# Patient Record
Sex: FEMALE | Race: WHITE | NOT HISPANIC OR LATINO
[De-identification: names, ages, dates, MRNs, and addresses within clinical notes are randomized per-mention and may not be internally consistent; named-entity substitution may affect disease eponyms.]

---

## 2019-02-11 PROBLEM — Z00.00 ENCOUNTER FOR PREVENTIVE HEALTH EXAMINATION: Status: ACTIVE | Noted: 2019-02-11

## 2019-02-13 ENCOUNTER — APPOINTMENT (OUTPATIENT)
Dept: HEART AND VASCULAR | Facility: CLINIC | Age: 49
End: 2019-02-13
Payer: COMMERCIAL

## 2019-02-13 VITALS
DIASTOLIC BLOOD PRESSURE: 84 MMHG | BODY MASS INDEX: 20.33 KG/M2 | SYSTOLIC BLOOD PRESSURE: 120 MMHG | OXYGEN SATURATION: 100 % | HEART RATE: 71 BPM | HEIGHT: 66.5 IN | WEIGHT: 128 LBS

## 2019-02-13 PROCEDURE — 99204 OFFICE O/P NEW MOD 45 MIN: CPT

## 2019-02-13 RX ORDER — HYDROCHLOROTHIAZIDE 25 MG/1
25 TABLET ORAL
Refills: 0 | Status: ACTIVE | COMMUNITY

## 2019-02-13 NOTE — REASON FOR VISIT
[Initial Evaluation] : an initial evaluation of [Chest Pain] : chest pain [Dyspnea] : dyspnea [FreeTextEntry1] : 48 year old female with family history of CAD presents for a visit. She remarks on dyspnea. This is often noted with activity. Symptoms always improve at rest. Occasional associated chest discomfort and arm pain noted as well. We are discussing an ischemic work up at this time. \par

## 2019-02-13 NOTE — PHYSICAL EXAM
[General Appearance - Well Developed] : well developed [Normal Appearance] : normal appearance [Well Groomed] : well groomed [General Appearance - Well Nourished] : well nourished [No Deformities] : no deformities [General Appearance - In No Acute Distress] : no acute distress [Normal Conjunctiva] : the conjunctiva exhibited no abnormalities [Eyelids - No Xanthelasma] : the eyelids demonstrated no xanthelasmas [Normal Oral Mucosa] : normal oral mucosa [No Oral Pallor] : no oral pallor [No Oral Cyanosis] : no oral cyanosis [Normal Jugular Venous A Waves Present] : normal jugular venous A waves present [Normal Jugular Venous V Waves Present] : normal jugular venous V waves present [No Jugular Venous Porras A Waves] : no jugular venous porras A waves [Heart Rate And Rhythm] : heart rate and rhythm were normal [Heart Sounds] : normal S1 and S2 [Murmurs] : no murmurs present [Respiration, Rhythm And Depth] : normal respiratory rhythm and effort [Exaggerated Use Of Accessory Muscles For Inspiration] : no accessory muscle use [Auscultation Breath Sounds / Voice Sounds] : lungs were clear to auscultation bilaterally [Abdomen Soft] : soft [Abdomen Tenderness] : non-tender [Abdomen Mass (___ Cm)] : no abdominal mass palpated [Abnormal Walk] : normal gait [Gait - Sufficient For Exercise Testing] : the gait was sufficient for exercise testing [Nail Clubbing] : no clubbing of the fingernails [Cyanosis, Localized] : no localized cyanosis [Petechial Hemorrhages (___cm)] : no petechial hemorrhages [Skin Color & Pigmentation] : normal skin color and pigmentation [] : no rash [No Venous Stasis] : no venous stasis [Skin Lesions] : no skin lesions [No Skin Ulcers] : no skin ulcer [No Xanthoma] : no  xanthoma was observed [Oriented To Time, Place, And Person] : oriented to person, place, and time [Affect] : the affect was normal [Mood] : the mood was normal [No Anxiety] : not feeling anxious

## 2019-02-27 ENCOUNTER — APPOINTMENT (OUTPATIENT)
Dept: HEART AND VASCULAR | Facility: CLINIC | Age: 49
End: 2019-02-27
Payer: COMMERCIAL

## 2019-02-27 VITALS
OXYGEN SATURATION: 98 % | SYSTOLIC BLOOD PRESSURE: 113 MMHG | HEART RATE: 71 BPM | TEMPERATURE: 97.6 F | DIASTOLIC BLOOD PRESSURE: 75 MMHG

## 2019-02-27 PROCEDURE — 93351 STRESS TTE COMPLETE: CPT

## 2019-04-01 ENCOUNTER — APPOINTMENT (OUTPATIENT)
Dept: OTOLARYNGOLOGY | Facility: CLINIC | Age: 49
End: 2019-04-01
Payer: COMMERCIAL

## 2019-04-01 PROCEDURE — 99213 OFFICE O/P EST LOW 20 MIN: CPT

## 2019-04-01 NOTE — ASSESSMENT
[FreeTextEntry1] : She is clinically stable. She will be sent for a repeat thyroid sonogram and he will be seen in follow up.

## 2019-04-01 NOTE — HISTORY OF PRESENT ILLNESS
[de-identified] : She is known to me. She has a history of a multinodular thyroid gland. She has had many ultrasounds and biopsies in the past year old biopsies been negative. She does note that time her right thyroid lobe swells. She has a history of normal thyroid function. She does not of a family history of thyroid cancer or radiation exposure. Her mother does take thyroid medication.

## 2019-04-17 ENCOUNTER — TRANSCRIPTION ENCOUNTER (OUTPATIENT)
Age: 49
End: 2019-04-17

## 2020-10-06 ENCOUNTER — EMERGENCY (EMERGENCY)
Facility: HOSPITAL | Age: 50
LOS: 1 days | Discharge: HOME | End: 2020-10-06
Attending: EMERGENCY MEDICINE | Admitting: EMERGENCY MEDICINE
Payer: COMMERCIAL

## 2020-10-06 VITALS
TEMPERATURE: 98 F | RESPIRATION RATE: 16 BRPM | DIASTOLIC BLOOD PRESSURE: 80 MMHG | OXYGEN SATURATION: 97 % | SYSTOLIC BLOOD PRESSURE: 118 MMHG | HEART RATE: 75 BPM

## 2020-10-06 PROCEDURE — 99283 EMERGENCY DEPT VISIT LOW MDM: CPT | Mod: CR

## 2020-10-06 NOTE — ED PROVIDER NOTE - PATIENT PORTAL LINK FT
You can access the FollowMyHealth Patient Portal offered by [facility] by registering at the following website: [email or web address]. By joining [employer]’s FollowMyHealth portal, you will also be able to view your health information using other applications (apps) compatible with our system.

## 2020-10-06 NOTE — ED PROVIDER NOTE - OBJECTIVE STATEMENT
51 y/o Female presenting to the ED requesting COVID-19 screening. Patient is currently asymptomatic and has no other medical complaints at this time. Denies fever, chills, chest pain, SOB.

## 2020-10-06 NOTE — ED PROVIDER NOTE - NSFOLLOWUPINSTRUCTIONS_ED_ALL_ED_FT
IF YOU DO NOT GET YOUR RESULTS WITHIN 48 HOURS PLEASE CALL [phone].    IF YOUR RESULT COMES BACK POSITIVE:    1. STAY HOME for 14 DAYS  2. Minimize Human contact to ONLY ESSENTIAL  3. Every time you wash your hands, sing the HAPPY BIRTHDAY Song so you know you're washing long enough.  Make sure to scrub the webspace between your fingers.  4. DRINK 1-3 Liters of fluids day x at least 5 days.  To remain hydrated. Your fatigue, lightheadedness, and body aches will decrease and your fever has a better chance of breaking if you are well hydrated.    5. For your Fever and Body aches takes Tylenol 650-100mg every 4-6h (max 4000mg/day). Try not to use ibuprofen, aspirin or naproxen (Advil, Motrin or Aleve) as these may worsen Coronavirus infection.  6. RETURN TO THE ER IMMEDIATELY IF YOU HAVE WORSENING SHORTNESS OF BREATH. SYMPTOMS USUALLY PEAK BETWEEN DAY 7-10.

## 2020-10-07 LAB — SARS-COV-2 RNA SPEC QL NAA+PROBE: SIGNIFICANT CHANGE UP

## 2020-10-10 DIAGNOSIS — Z20.828 CONTACT WITH AND (SUSPECTED) EXPOSURE TO OTHER VIRAL COMMUNICABLE DISEASES: ICD-10-CM

## 2020-12-17 ENCOUNTER — EMERGENCY (EMERGENCY)
Facility: HOSPITAL | Age: 50
LOS: 1 days | Discharge: HOME | End: 2020-12-17
Attending: EMERGENCY MEDICINE | Admitting: EMERGENCY MEDICINE
Payer: COMMERCIAL

## 2020-12-17 VITALS
TEMPERATURE: 98 F | SYSTOLIC BLOOD PRESSURE: 144 MMHG | HEART RATE: 74 BPM | WEIGHT: 139.99 LBS | RESPIRATION RATE: 16 BRPM | HEIGHT: 66 IN | OXYGEN SATURATION: 98 % | DIASTOLIC BLOOD PRESSURE: 87 MMHG

## 2020-12-17 DIAGNOSIS — Z20.828 CONTACT WITH AND (SUSPECTED) EXPOSURE TO OTHER VIRAL COMMUNICABLE DISEASES: ICD-10-CM

## 2020-12-17 PROCEDURE — 99283 EMERGENCY DEPT VISIT LOW MDM: CPT | Mod: CR

## 2020-12-17 NOTE — ED PROVIDER NOTE - PATIENT PORTAL LINK FT
You can access the FollowMyHealth Patient Portal offered by [facility] by registering at the following website: [email or web address]. By joining [employer]’s FollowMyHealth portal, you will also be able to view your health information using other applications (apps) compatible with our system.

## 2020-12-17 NOTE — ED PROVIDER NOTE - OBJECTIVE STATEMENT
The patient is presenting to the ED requesting COVID-19 screening. The patient is currently asymptomatic and has no other medical complaints at this time. Denies headache,  fever, chills, chest pain, SOB, cough, body aches, change to sense of smell or taste

## 2020-12-18 LAB — SARS-COV-2 RNA SPEC QL NAA+PROBE: SIGNIFICANT CHANGE UP

## 2021-01-12 ENCOUNTER — NON-APPOINTMENT (OUTPATIENT)
Age: 51
End: 2021-01-12

## 2021-01-12 ENCOUNTER — APPOINTMENT (OUTPATIENT)
Dept: HEART AND VASCULAR | Facility: CLINIC | Age: 51
End: 2021-01-12
Payer: COMMERCIAL

## 2021-01-12 VITALS
BODY MASS INDEX: 21.12 KG/M2 | HEIGHT: 66.5 IN | SYSTOLIC BLOOD PRESSURE: 123 MMHG | HEART RATE: 97 BPM | OXYGEN SATURATION: 97 % | TEMPERATURE: 98.9 F | DIASTOLIC BLOOD PRESSURE: 82 MMHG | WEIGHT: 133 LBS

## 2021-01-12 PROCEDURE — 99214 OFFICE O/P EST MOD 30 MIN: CPT

## 2021-01-12 PROCEDURE — 93000 ELECTROCARDIOGRAM COMPLETE: CPT

## 2021-01-12 PROCEDURE — 99072 ADDL SUPL MATRL&STAF TM PHE: CPT

## 2021-01-12 RX ORDER — HYDROCHLOROTHIAZIDE 12.5 MG/1
TABLET ORAL
Refills: 0 | Status: COMPLETED | COMMUNITY
End: 2021-01-12

## 2021-01-12 NOTE — REASON FOR VISIT
[Follow-Up - Clinic] : a clinic follow-up of [FreeTextEntry1] : 48 year old female with family history of CAD presents for a follow up evaluation. She remarks on dyspnea and occasional chest discomfort. This is often noted with activity. Symptoms always improve at rest. Occasional associated chest discomfort and arm pain noted as well. We are discussing an ischemic work up at this time. Last testing was done in 2019. In addition, we a reviewing her lipids from [facility] and discussing a Ca score. \par

## 2021-01-12 NOTE — PHYSICAL EXAM
[General Appearance - Well Developed] : well developed [Normal Appearance] : normal appearance [Well Groomed] : well groomed [General Appearance - Well Nourished] : well nourished [No Deformities] : no deformities [Normal Conjunctiva] : the conjunctiva exhibited no abnormalities [General Appearance - In No Acute Distress] : no acute distress [Eyelids - No Xanthelasma] : the eyelids demonstrated no xanthelasmas [Normal Oral Mucosa] : normal oral mucosa [No Oral Pallor] : no oral pallor [No Oral Cyanosis] : no oral cyanosis [Normal Jugular Venous A Waves Present] : normal jugular venous A waves present [Normal Jugular Venous V Waves Present] : normal jugular venous V waves present [No Jugular Venous Porras A Waves] : no jugular venous porras A waves [Respiration, Rhythm And Depth] : normal respiratory rhythm and effort [Exaggerated Use Of Accessory Muscles For Inspiration] : no accessory muscle use [Auscultation Breath Sounds / Voice Sounds] : lungs were clear to auscultation bilaterally [Heart Rate And Rhythm] : heart rate and rhythm were normal [Heart Sounds] : normal S1 and S2 [Murmurs] : no murmurs present [Abdomen Soft] : soft [Abdomen Tenderness] : non-tender [Abdomen Mass (___ Cm)] : no abdominal mass palpated [Abnormal Walk] : normal gait [Gait - Sufficient For Exercise Testing] : the gait was sufficient for exercise testing [Nail Clubbing] : no clubbing of the fingernails [Cyanosis, Localized] : no localized cyanosis [Petechial Hemorrhages (___cm)] : no petechial hemorrhages [Skin Color & Pigmentation] : normal skin color and pigmentation [] : no rash [No Venous Stasis] : no venous stasis [Skin Lesions] : no skin lesions [No Skin Ulcers] : no skin ulcer [No Xanthoma] : no  xanthoma was observed [Oriented To Time, Place, And Person] : oriented to person, place, and time [Mood] : the mood was normal [Affect] : the affect was normal [No Anxiety] : not feeling anxious

## 2021-01-12 NOTE — DISCUSSION/SUMMARY
[FreeTextEntry1] : HTN - MARK  and I had an extensive discussion regarding his blood pressure management. Patient will continue taking current medications in addition to maintaining a low Na diet, with periodic b/p checks at home.\par CP MARK and I had a discussion of his symptoms. Her risk for CAD falls intro intermediate. We will therefore move forward with a stress echo at this time to evaluate for obstructive CAD. Risks and benefits discussed. Will add a Ca score as well\par

## 2021-01-19 ENCOUNTER — APPOINTMENT (OUTPATIENT)
Dept: CT IMAGING | Facility: CLINIC | Age: 51
End: 2021-01-19
Payer: SELF-PAY

## 2021-01-19 ENCOUNTER — OUTPATIENT (OUTPATIENT)
Dept: OUTPATIENT SERVICES | Facility: HOSPITAL | Age: 51
LOS: 1 days | End: 2021-01-19

## 2021-01-19 PROCEDURE — 75571 CT HRT W/O DYE W/CA TEST: CPT | Mod: 26

## 2021-01-26 ENCOUNTER — APPOINTMENT (OUTPATIENT)
Dept: HEART AND VASCULAR | Facility: CLINIC | Age: 51
End: 2021-01-26
Payer: COMMERCIAL

## 2021-01-26 PROCEDURE — 93351 STRESS TTE COMPLETE: CPT

## 2021-01-26 PROCEDURE — 99072 ADDL SUPL MATRL&STAF TM PHE: CPT

## 2021-01-26 PROCEDURE — 99214 OFFICE O/P EST MOD 30 MIN: CPT

## 2021-01-26 NOTE — PHYSICAL EXAM
[General Appearance - Well Developed] : well developed [Normal Appearance] : normal appearance [Well Groomed] : well groomed [General Appearance - Well Nourished] : well nourished [No Deformities] : no deformities [General Appearance - In No Acute Distress] : no acute distress [Normal Conjunctiva] : the conjunctiva exhibited no abnormalities [Eyelids - No Xanthelasma] : the eyelids demonstrated no xanthelasmas [Normal Oral Mucosa] : normal oral mucosa [No Oral Pallor] : no oral pallor [No Oral Cyanosis] : no oral cyanosis [Normal Jugular Venous A Waves Present] : normal jugular venous A waves present [Normal Jugular Venous V Waves Present] : normal jugular venous V waves present [No Jugular Venous Porras A Waves] : no jugular venous porras A waves [Respiration, Rhythm And Depth] : normal respiratory rhythm and effort [Exaggerated Use Of Accessory Muscles For Inspiration] : no accessory muscle use [Auscultation Breath Sounds / Voice Sounds] : lungs were clear to auscultation bilaterally [Heart Rate And Rhythm] : heart rate and rhythm were normal [Heart Sounds] : normal S1 and S2 [Murmurs] : no murmurs present [Abdomen Soft] : soft [Abdomen Tenderness] : non-tender [Abdomen Mass (___ Cm)] : no abdominal mass palpated [Abnormal Walk] : normal gait [Gait - Sufficient For Exercise Testing] : the gait was sufficient for exercise testing [Nail Clubbing] : no clubbing of the fingernails [Cyanosis, Localized] : no localized cyanosis [Petechial Hemorrhages (___cm)] : no petechial hemorrhages [Skin Color & Pigmentation] : normal skin color and pigmentation [] : no rash [No Venous Stasis] : no venous stasis [No Skin Ulcers] : no skin ulcer [Skin Lesions] : no skin lesions [No Xanthoma] : no  xanthoma was observed [Oriented To Time, Place, And Person] : oriented to person, place, and time [Affect] : the affect was normal [Mood] : the mood was normal [No Anxiety] : not feeling anxious

## 2021-01-26 NOTE — DISCUSSION/SUMMARY
[FreeTextEntry1] : HTN - MARK  and I had an extensive discussion regarding his blood pressure management. Patient will continue taking current medications in addition to maintaining a low Na diet, with periodic b/p checks at home.\par CP Inclined towards a conservative follow up in this patient. We had a careful discussion regarding diet and exercise. Will be happy to re-evaluate.\par

## 2021-01-26 NOTE — REASON FOR VISIT
[Follow-Up - Clinic] : a clinic follow-up of [Hypertension] : hypertension [FreeTextEntry1] : 48 year old female with family history of CAD presents for a follow up evaluation. She remarks on dyspnea and occasional chest discomfort. This is often noted with activity. Symptoms always improve at rest. Occasional associated chest discomfort and arm pain noted as well. We are discussing an ischemic work up at this time. Last testing was done in 2019. In addition, we a reviewing her lipids from [facility] and discussing a Ca score. She also completed a stress echo today. \par

## 2021-02-21 ENCOUNTER — EMERGENCY (EMERGENCY)
Facility: HOSPITAL | Age: 51
LOS: 1 days | Discharge: HOME | End: 2021-02-21
Attending: EMERGENCY MEDICINE | Admitting: EMERGENCY MEDICINE
Payer: COMMERCIAL

## 2021-02-21 VITALS
DIASTOLIC BLOOD PRESSURE: 80 MMHG | HEIGHT: 66 IN | SYSTOLIC BLOOD PRESSURE: 126 MMHG | TEMPERATURE: 99 F | RESPIRATION RATE: 18 BRPM | OXYGEN SATURATION: 97 % | HEART RATE: 86 BPM

## 2021-02-21 DIAGNOSIS — Z20.822 CONTACT WITH AND (SUSPECTED) EXPOSURE TO COVID-19: ICD-10-CM

## 2021-02-21 PROCEDURE — 99282 EMERGENCY DEPT VISIT SF MDM: CPT

## 2021-02-21 NOTE — ED PROVIDER NOTE - OBJECTIVE STATEMENT
The patient is presenting to the ED requesting COVID-19 screening. The patient is currently asymptomatic and has no other medical complaints at this time. Denies headache,  fever, chills, chest pain, SOB, cough, body aches, nausea/vomiting/diarrhea, or change to sense of smell or taste.  The patient just returned from [facility] yesterday.  No known exposures.  She and her family had COVID in March 2020 with full recovery and have tested positive for antibodies since then.

## 2021-02-21 NOTE — ED PROVIDER NOTE - PATIENT PORTAL LINK FT
You can access the FollowMyHealth Patient Portal offered by [facility] by registering at the following website: [email or web address]. By joining [email or web address]’s FollowMyHealth portal, you will also be able to view your health information using other applications (apps) compatible with our system.

## 2021-02-21 NOTE — ED PROVIDER NOTE - CLINICAL SUMMARY MEDICAL DECISION MAKING FREE TEXT BOX
Asymptomatic patient here for COVID screening. Risk of exposure is very low. Reviewed vitals and testing plan with the patient. Encouraged to download the MyHealth kaycee for test results.

## 2021-02-22 LAB — SARS-COV-2 RNA SPEC QL NAA+PROBE: SIGNIFICANT CHANGE UP

## 2021-02-24 ENCOUNTER — EMERGENCY (EMERGENCY)
Facility: HOSPITAL | Age: 51
LOS: 1 days | Discharge: HOME | End: 2021-02-24
Attending: EMERGENCY MEDICINE | Admitting: EMERGENCY MEDICINE
Payer: COMMERCIAL

## 2021-02-24 VITALS
DIASTOLIC BLOOD PRESSURE: 83 MMHG | RESPIRATION RATE: 18 BRPM | HEIGHT: 66 IN | TEMPERATURE: 98 F | HEART RATE: 73 BPM | OXYGEN SATURATION: 98 % | SYSTOLIC BLOOD PRESSURE: 127 MMHG

## 2021-02-24 DIAGNOSIS — Z20.822 CONTACT WITH AND (SUSPECTED) EXPOSURE TO COVID-19: ICD-10-CM

## 2021-02-24 PROCEDURE — 99282 EMERGENCY DEPT VISIT SF MDM: CPT

## 2021-02-24 NOTE — ED PROVIDER NOTE - OBJECTIVE STATEMENT
Patient is presenting to the Emergency Department with a request to have COVID-19 testing.  Denies symptoms, including cough, shortness of breath, fever, chills, body aches or sore throat. Patient is presenting to the Emergency Department with a request to have COVID-19 testing.  Denies symptoms, including cough, shortness of breath, fever, chills, body aches or sore throat.  hx COVID March 2020

## 2021-02-24 NOTE — ED PROVIDER NOTE - PATIENT PORTAL LINK FT
You can access the FollowMyHealth Patient Portal offered by [facility] by registering at the following website: [email or web address]. By joining [employer]’s FollowMyHealth portal, you will also be able to view your health information using other applications (apps) compatible with our system.

## 2021-02-25 PROBLEM — Z78.9 OTHER SPECIFIED HEALTH STATUS: Chronic | Status: ACTIVE | Noted: 2021-02-21

## 2021-02-25 LAB — SARS-COV-2 RNA SPEC QL NAA+PROBE: SIGNIFICANT CHANGE UP

## 2021-04-14 ENCOUNTER — NON-APPOINTMENT (OUTPATIENT)
Age: 51
End: 2021-04-14

## 2021-04-14 ENCOUNTER — APPOINTMENT (OUTPATIENT)
Age: 51
End: 2021-04-14
Payer: COMMERCIAL

## 2021-04-14 PROCEDURE — 0001A: CPT

## 2021-04-30 ENCOUNTER — APPOINTMENT (OUTPATIENT)
Dept: OTOLARYNGOLOGY | Facility: CLINIC | Age: 51
End: 2021-04-30
Payer: COMMERCIAL

## 2021-04-30 VITALS — HEIGHT: 66.5 IN | WEIGHT: 132 LBS | BODY MASS INDEX: 20.96 KG/M2 | TEMPERATURE: 98.2 F

## 2021-04-30 PROCEDURE — 99213 OFFICE O/P EST LOW 20 MIN: CPT

## 2021-04-30 PROCEDURE — 99072 ADDL SUPL MATRL&STAF TM PHE: CPT

## 2021-05-04 NOTE — HISTORY OF PRESENT ILLNESS
[de-identified] : She is well-known to me.\par She has a history of a multinodular thyroid gland.\par An ultrasound 2019 of her thyroid gland stable.\par She does not have a family history of thyroid cancer or significant radiation exposure. She is not on any compressive features.\par

## 2021-05-04 NOTE — ASSESSMENT
[FreeTextEntry1] : Multinodular thyroid gland.\par She will be sent for sonogram of her thyroid for further evaluation and be seen in follow.

## 2021-05-05 ENCOUNTER — APPOINTMENT (OUTPATIENT)
Age: 51
End: 2021-05-05
Payer: COMMERCIAL

## 2021-05-05 PROCEDURE — 0002A: CPT

## 2021-05-08 ENCOUNTER — RESULT REVIEW (OUTPATIENT)
Age: 51
End: 2021-05-08

## 2021-05-08 ENCOUNTER — APPOINTMENT (OUTPATIENT)
Dept: ULTRASOUND IMAGING | Facility: CLINIC | Age: 51
End: 2021-05-08
Payer: COMMERCIAL

## 2021-05-08 ENCOUNTER — OUTPATIENT (OUTPATIENT)
Dept: OUTPATIENT SERVICES | Facility: HOSPITAL | Age: 51
LOS: 1 days | End: 2021-05-08

## 2021-05-08 PROCEDURE — 76536 US EXAM OF HEAD AND NECK: CPT | Mod: 26

## 2021-06-03 ENCOUNTER — RESULT REVIEW (OUTPATIENT)
Age: 51
End: 2021-06-03

## 2021-06-03 ENCOUNTER — OUTPATIENT (OUTPATIENT)
Dept: OUTPATIENT SERVICES | Facility: HOSPITAL | Age: 51
LOS: 1 days | End: 2021-06-03

## 2021-06-03 ENCOUNTER — APPOINTMENT (OUTPATIENT)
Dept: ULTRASOUND IMAGING | Facility: CLINIC | Age: 51
End: 2021-06-03
Payer: COMMERCIAL

## 2021-06-03 PROCEDURE — 88305 TISSUE EXAM BY PATHOLOGIST: CPT | Mod: 26

## 2021-06-03 PROCEDURE — 10006 FNA BX W/US GDN EA ADDL: CPT

## 2021-06-03 PROCEDURE — 88173 CYTOPATH EVAL FNA REPORT: CPT | Mod: 26

## 2021-06-03 PROCEDURE — 10005 FNA BX W/US GDN 1ST LES: CPT

## 2021-06-08 LAB
NON-GYNECOLOGICAL CYTOLOGY STUDY: SIGNIFICANT CHANGE UP
NON-GYNECOLOGICAL CYTOLOGY STUDY: SIGNIFICANT CHANGE UP

## 2021-07-20 ENCOUNTER — APPOINTMENT (OUTPATIENT)
Dept: HEART AND VASCULAR | Facility: CLINIC | Age: 51
End: 2021-07-20
Payer: COMMERCIAL

## 2021-07-20 VITALS
DIASTOLIC BLOOD PRESSURE: 79 MMHG | BODY MASS INDEX: 21.44 KG/M2 | SYSTOLIC BLOOD PRESSURE: 114 MMHG | HEART RATE: 80 BPM | WEIGHT: 135 LBS | OXYGEN SATURATION: 99 % | HEIGHT: 66.5 IN

## 2021-07-20 PROCEDURE — 99072 ADDL SUPL MATRL&STAF TM PHE: CPT

## 2021-07-20 PROCEDURE — 99214 OFFICE O/P EST MOD 30 MIN: CPT

## 2021-07-20 NOTE — REASON FOR VISIT
[Follow-Up - Clinic] : a clinic follow-up of [Hypertension] : hypertension [FreeTextEntry1] : 48 year old female with family history of CAD presents for a follow up evaluation. She remarks on dyspnea and occasional chest discomfort. This is often noted with activity. Symptoms always improve at rest. Occasional associated chest discomfort and arm pain noted as well. We are discussing an ischemic work up at this time. Last testing was done in 2019. In addition, we a reviewing her lipids from [facility] and discussing a Ca score. She also completed a stress echo today. Since her last visit she has been well with no CV symptoms. Her angina has now resolved but she follows up 2/2 family hx of heart disease. She reports eating healthy but has increased protein in her diet as recommended by her PMD. She will have her labs drawn at [facility] and send over the results.\par

## 2021-07-20 NOTE — DISCUSSION/SUMMARY
[FreeTextEntry1] : HTN - MARK  and I had an extensive discussion regarding his blood pressure management. Patient will continue taking current medications in addition to maintaining a low Na diet, with periodic b/p checks at home.\par CP Inclined towards a conservative follow up in this patient. We had a careful discussion regarding diet and exercise. Will be happy to re-evaluate.\par Results of testing discussed; will see in 6 months

## 2021-07-20 NOTE — PHYSICAL EXAM
[General Appearance - Well Developed] : well developed [Normal Appearance] : normal appearance [Well Groomed] : well groomed [General Appearance - Well Nourished] : well nourished [No Deformities] : no deformities [General Appearance - In No Acute Distress] : no acute distress [Eyelids - No Xanthelasma] : the eyelids demonstrated no xanthelasmas [Normal Oral Mucosa] : normal oral mucosa [No Oral Pallor] : no oral pallor [No Oral Cyanosis] : no oral cyanosis [Normal Jugular Venous A Waves Present] : normal jugular venous A waves present [Normal Jugular Venous V Waves Present] : normal jugular venous V waves present [No Jugular Venous Porras A Waves] : no jugular venous porras A waves [Respiration, Rhythm And Depth] : normal respiratory rhythm and effort [Exaggerated Use Of Accessory Muscles For Inspiration] : no accessory muscle use [Auscultation Breath Sounds / Voice Sounds] : lungs were clear to auscultation bilaterally [Heart Rate And Rhythm] : heart rate and rhythm were normal [Heart Sounds] : normal S1 and S2 [Murmurs] : no murmurs present [Abdomen Soft] : soft [Abdomen Tenderness] : non-tender [Abdomen Mass (___ Cm)] : no abdominal mass palpated [Abnormal Walk] : normal gait [Gait - Sufficient For Exercise Testing] : the gait was sufficient for exercise testing [Nail Clubbing] : no clubbing of the fingernails [Cyanosis, Localized] : no localized cyanosis [Petechial Hemorrhages (___cm)] : no petechial hemorrhages [Skin Color & Pigmentation] : normal skin color and pigmentation [] : no rash [No Venous Stasis] : no venous stasis [No Skin Ulcers] : no skin ulcer [Skin Lesions] : no skin lesions [No Xanthoma] : no  xanthoma was observed [Oriented To Time, Place, And Person] : oriented to person, place, and time [Affect] : the affect was normal [Mood] : the mood was normal [No Anxiety] : not feeling anxious [Well Developed] : well developed [Well Nourished] : well nourished [Normal Conjunctiva] : normal conjunctiva [No Acute Distress] : no acute distress [Normal Venous Pressure] : normal venous pressure [No Carotid Bruit] : no carotid bruit [Normal S1, S2] : normal S1, S2 [No Murmur] : no murmur [No Rub] : no rub [No Gallop] : no gallop [Good Air Entry] : good air entry [Clear Lung Fields] : clear lung fields [No Respiratory Distress] : no respiratory distress  [Soft] : abdomen soft [Non Tender] : non-tender [No Masses/organomegaly] : no masses/organomegaly [Normal Bowel Sounds] : normal bowel sounds [Normal Gait] : normal gait [No Edema] : no edema [No Cyanosis] : no cyanosis [No Varicosities] : no varicosities [No Clubbing] : no clubbing [No Rash] : no rash [No Skin Lesions] : no skin lesions [Moves all extremities] : moves all extremities [No Focal Deficits] : no focal deficits [Normal Speech] : normal speech [Normal memory] : normal memory [Alert and Oriented] : alert and oriented

## 2021-08-23 NOTE — ED ADULT TRIAGE NOTE - IDEAL BODY WEIGHT(KG)
Called Patient and left message for Patient to call clinic to schedule hospital visit for hypothyroidism  
Called and left message for Patient to call clinic and schedule a hospital follow-up appointment. Letter sent.  
Please call patient to arrange endocrine hospital follow up for hypothyroidism with next available APC or MD in 4-6 weeks.   Thank you!  
59

## 2022-01-14 ENCOUNTER — NON-APPOINTMENT (OUTPATIENT)
Age: 52
End: 2022-01-14

## 2022-01-14 ENCOUNTER — APPOINTMENT (OUTPATIENT)
Dept: HEART AND VASCULAR | Facility: CLINIC | Age: 52
End: 2022-01-14
Payer: COMMERCIAL

## 2022-01-14 VITALS
DIASTOLIC BLOOD PRESSURE: 68 MMHG | HEIGHT: 66.5 IN | TEMPERATURE: 98.3 F | HEART RATE: 78 BPM | OXYGEN SATURATION: 97 % | SYSTOLIC BLOOD PRESSURE: 123 MMHG | WEIGHT: 133 LBS | BODY MASS INDEX: 21.12 KG/M2

## 2022-01-14 PROCEDURE — 93000 ELECTROCARDIOGRAM COMPLETE: CPT

## 2022-01-14 PROCEDURE — 99214 OFFICE O/P EST MOD 30 MIN: CPT

## 2022-01-14 NOTE — REVIEW OF SYSTEMS
[Fever] : no fever [Chills] : no chills [Blurry Vision] : no blurred vision [SOB] : no shortness of breath [Dyspnea on exertion] : not dyspnea during exertion [Chest Discomfort] : no chest discomfort [Lower Ext Edema] : no extremity edema [Palpitations] : no palpitations [Cough] : no cough [Abdominal Pain] : no abdominal pain [Nausea] : no nausea [Vomiting] : no vomiting [Negative] : Heme/Lymph

## 2022-01-14 NOTE — PHYSICAL EXAM
[Alert and Oriented] : alert and oriented [Well Developed] : well developed [Well Nourished] : well nourished [No Acute Distress] : no acute distress [Normal Conjunctiva] : normal conjunctiva [Normal Venous Pressure] : normal venous pressure [No Carotid Bruit] : no carotid bruit [Normal S1, S2] : normal S1, S2 [No Murmur] : no murmur [No Rub] : no rub [No Gallop] : no gallop [Clear Lung Fields] : clear lung fields [Good Air Entry] : good air entry [No Respiratory Distress] : no respiratory distress  [Soft] : abdomen soft [Non Tender] : non-tender [No Masses/organomegaly] : no masses/organomegaly [Normal Bowel Sounds] : normal bowel sounds [Normal Gait] : normal gait [No Edema] : no edema [No Cyanosis] : no cyanosis [No Clubbing] : no clubbing [No Varicosities] : no varicosities [No Rash] : no rash [No Skin Lesions] : no skin lesions [Moves all extremities] : moves all extremities [No Focal Deficits] : no focal deficits [Normal Speech] : normal speech [Normal memory] : normal memory

## 2022-01-14 NOTE — ASSESSMENT
[FreeTextEntry1] : A/P: 51F w/HTN & FHx of CAD presenting for follow up.\par \par #Chest tightness - transient episode, no further episodes. Low suspicion for cardiac chest pain given negative calcium socre and stress echo from last year.\par - will monitor for further symptoms\par \par #HTN - managed by primary care doctor\par - c/w HCTZ\par \par #Screening\par - to get annual physical & labs with primary doctor\par \par RTC in 6 months or PRN\par

## 2022-01-14 NOTE — REASON FOR VISIT
[Symptom and Test Evaluation] : symptom and test evaluation [FreeTextEntry1] : 51F w/HTN & FHx of CAD presenting for follow up. Overall feels well. Episode of waking up in the middle of night with left sided chest tightness one month ago that resolved after a half hour. She has not had it happen again since then. She attributed it to heartburn. Maintains an active lifestyle; jogs 3x/week and pilates 2x/week.Had a recent viral illness with GI symptoms; tested (-) multiple times for COVID. Follows with Dr. Espinal at [facility] for primary care.

## 2022-01-14 NOTE — DISCUSSION/SUMMARY
[FreeTextEntry1] : CP Inclined towards a conservative follow up in this patient. We had a careful discussion regarding diet and exercise. Will be happy to re-evaluate. No additional testing for now\par HTN - MARK  and I had an extensive discussion regarding his blood pressure management. Patient will continue taking current medications in addition to maintaining a low Na diet, with periodic b/p checks at home.\par EKG section of the chart --- secondary to symptoms above an electrocardiogram also known as an EKG was performed.  Risks and benefits discussed with the patient. Patient was given time and privacy to changed into a gown. Shortly after, standard 10 leads were applied and a Blastbeat system was used to perform the study. The results were subsequently reviewed by attending physician and discussed with the patient. The study showed a normal sinus rhythm and no ST-T suggestive of ischemia. Order for the EKG was placed in the chart. The results were documented. Billing submitted. Emotional support provided.\par

## 2022-07-08 ENCOUNTER — NON-APPOINTMENT (OUTPATIENT)
Age: 52
End: 2022-07-08

## 2022-07-08 ENCOUNTER — APPOINTMENT (OUTPATIENT)
Dept: HEART AND VASCULAR | Facility: CLINIC | Age: 52
End: 2022-07-08

## 2022-07-08 VITALS
HEIGHT: 66.5 IN | HEART RATE: 69 BPM | OXYGEN SATURATION: 99 % | BODY MASS INDEX: 21.44 KG/M2 | TEMPERATURE: 97.9 F | DIASTOLIC BLOOD PRESSURE: 81 MMHG | WEIGHT: 135 LBS | SYSTOLIC BLOOD PRESSURE: 121 MMHG

## 2022-07-08 PROCEDURE — 93000 ELECTROCARDIOGRAM COMPLETE: CPT

## 2022-07-08 PROCEDURE — 99213 OFFICE O/P EST LOW 20 MIN: CPT

## 2022-07-08 NOTE — REASON FOR VISIT
[Symptom and Test Evaluation] : symptom and test evaluation [FreeTextEntry1] : Presents for a follow up and re-evaluation. No chest pain. Occasional dyspnea noted. She is quite active. No recent PMD evaluation. No syncope. Doing well otherwise.

## 2022-07-08 NOTE — DISCUSSION/SUMMARY
[FreeTextEntry1] : CP Inclined towards a conservative follow up in this patient. We had a careful discussion regarding diet and exercise. Will be happy to re-evaluate.\par HTN - MARK  and I had an extensive discussion regarding his blood pressure management. Patient will continue taking current medications in addition to maintaining a low Na diet, with periodic b/p checks at home.\par EKG section of the chart --- secondary to symptoms above an electrocardiogram also known as an EKG was performed.  Risks and benefits discussed with the patient. Patient was given time and privacy to changed into a gown. Shortly after, standard 10 leads were applied and a [employer] system was used to perform the study. The results were subsequently reviewed by attending physician and discussed with the patient. The study showed a normal sinus rhythm and no ST-T suggestive of ischemia. Order for the EKG was placed in the chart. The results were documented. Billing submitted. Emotional support provided.\par

## 2022-12-14 ENCOUNTER — APPOINTMENT (OUTPATIENT)
Dept: HEART AND VASCULAR | Facility: CLINIC | Age: 52
End: 2022-12-14

## 2022-12-14 PROCEDURE — 99442: CPT

## 2023-01-30 ENCOUNTER — NON-APPOINTMENT (OUTPATIENT)
Age: 53
End: 2023-01-30

## 2023-01-30 ENCOUNTER — APPOINTMENT (OUTPATIENT)
Dept: HEART AND VASCULAR | Facility: CLINIC | Age: 53
End: 2023-01-30
Payer: COMMERCIAL

## 2023-01-30 VITALS
HEART RATE: 72 BPM | DIASTOLIC BLOOD PRESSURE: 70 MMHG | HEIGHT: 66.5 IN | SYSTOLIC BLOOD PRESSURE: 107 MMHG | OXYGEN SATURATION: 99 % | BODY MASS INDEX: 22.23 KG/M2 | WEIGHT: 140 LBS

## 2023-01-30 DIAGNOSIS — E04.2 NONTOXIC MULTINODULAR GOITER: ICD-10-CM

## 2023-01-30 PROCEDURE — 99214 OFFICE O/P EST MOD 30 MIN: CPT

## 2023-01-30 PROCEDURE — 93000 ELECTROCARDIOGRAM COMPLETE: CPT

## 2023-01-30 RX ORDER — ROSUVASTATIN CALCIUM 10 MG/1
10 TABLET, FILM COATED ORAL
Qty: 30 | Refills: 3 | Status: ACTIVE | COMMUNITY
Start: 2023-01-30

## 2023-01-30 NOTE — DISCUSSION/SUMMARY
[FreeTextEntry1] : CP/SOB Inclined towards a conservative follow up in this patient. We had a careful discussion regarding diet and exercise. Will be happy to re-evaluate.\par HTN - MARK  and I had an extensive discussion regarding his blood pressure management. Patient will continue taking current medications in addition to maintaining a low Na diet, with periodic b/p checks at home.\par HLD MARK and I discussed his lipid panel and individualized target LDL goal. At this point, will do diet and exercise with anticipation of re-evaluating labs in 3-6 months\par CAD cont ASA\par EKG section of the chart --- secondary to symptoms above an electrocardiogram also known as an EKG was performed.  Risks and benefits discussed with the patient. Patient was given time and privacy to changed into a gown. Shortly after, standard 10 leads were applied and a [employer] system was used to perform the study. The results were subsequently reviewed by attending physician and discussed with the patient. The study showed a normal sinus rhythm and no ST-T suggestive of ischemia. Order for the EKG was placed in the chart. The results were documented. Billing submitted. Emotional support provided.\par

## 2023-07-26 ENCOUNTER — APPOINTMENT (OUTPATIENT)
Dept: HEART AND VASCULAR | Facility: CLINIC | Age: 53
End: 2023-07-26

## 2023-07-28 ENCOUNTER — NON-APPOINTMENT (OUTPATIENT)
Age: 53
End: 2023-07-28

## 2023-07-28 ENCOUNTER — APPOINTMENT (OUTPATIENT)
Dept: HEART AND VASCULAR | Facility: CLINIC | Age: 53
End: 2023-07-28
Payer: COMMERCIAL

## 2023-07-28 VITALS
SYSTOLIC BLOOD PRESSURE: 118 MMHG | WEIGHT: 132 LBS | DIASTOLIC BLOOD PRESSURE: 83 MMHG | HEIGHT: 66 IN | OXYGEN SATURATION: 98 % | HEART RATE: 82 BPM | BODY MASS INDEX: 21.21 KG/M2 | TEMPERATURE: 97.9 F

## 2023-07-28 PROCEDURE — 93000 ELECTROCARDIOGRAM COMPLETE: CPT

## 2023-07-28 PROCEDURE — 99214 OFFICE O/P EST MOD 30 MIN: CPT

## 2023-07-28 NOTE — DISCUSSION/SUMMARY
[FreeTextEntry1] : SOB/CP check echo if able to approve and schedule.\par Leg fatigue check RADHA and doppler if able to approve and schedule.\par Edema improved check venous us if able to approve and schedule.\par HTN - MARK  and I had an extensive discussion regarding his blood pressure management. Patient will continue taking current medications in addition to maintaining a low Na diet, with periodic b/p checks at home.\par HLD MARK and I discussed his lipid panel and individualized target LDL goal. At this point, will do diet and exercise with anticipation of re-evaluating labs in 3-6 months\par EKG section of the chart --- secondary to symptoms above an electrocardiogram also known as an EKG was performed.  Risks and benefits discussed with the patient. Patient was given time and privacy to changed into a gown. Shortly after, standard 10 leads were applied and a [employer] system was used to perform the study. The results were subsequently reviewed by attending physician and discussed with the patient. The study showed a normal sinus rhythm and no ST-T suggestive of ischemia. Order for the EKG was placed in the chart. The results were documented. Billing submitted.\par  [EKG obtained to assist in diagnosis and management of assessed problem(s)] : EKG obtained to assist in diagnosis and management of assessed problem(s)

## 2023-07-28 NOTE — REASON FOR VISIT
[Symptom and Test Evaluation] : symptom and test evaluation [FreeTextEntry1] : Presents for a follow up and re-evaluation. No chest pain. Occasional dyspnea noted. She is quite active. No recent PMD evaluation. No syncope. Doing well otherwise. She does remark on edema and leg discomfort on occasion. This is often noted with activity. Symptoms always improve at rest.\par We are reviewing a PVD work up at this time.

## 2023-10-06 ENCOUNTER — APPOINTMENT (OUTPATIENT)
Dept: HEART AND VASCULAR | Facility: CLINIC | Age: 53
End: 2023-10-06
Payer: COMMERCIAL

## 2023-10-06 DIAGNOSIS — I10 ESSENTIAL (PRIMARY) HYPERTENSION: ICD-10-CM

## 2023-10-06 DIAGNOSIS — R07.89 OTHER CHEST PAIN: ICD-10-CM

## 2023-10-06 PROCEDURE — 93970 EXTREMITY STUDY: CPT

## 2023-10-06 PROCEDURE — 93922 UPR/L XTREMITY ART 2 LEVELS: CPT

## 2023-10-06 PROCEDURE — 93925 LOWER EXTREMITY STUDY: CPT

## 2023-10-13 ENCOUNTER — APPOINTMENT (OUTPATIENT)
Dept: HEART AND VASCULAR | Facility: CLINIC | Age: 53
End: 2023-10-13
Payer: COMMERCIAL

## 2023-10-13 VITALS
HEIGHT: 66 IN | DIASTOLIC BLOOD PRESSURE: 85 MMHG | OXYGEN SATURATION: 99 % | TEMPERATURE: 98.2 F | WEIGHT: 132 LBS | SYSTOLIC BLOOD PRESSURE: 124 MMHG | HEART RATE: 85 BPM | BODY MASS INDEX: 21.21 KG/M2

## 2023-10-13 PROCEDURE — 93306 TTE W/DOPPLER COMPLETE: CPT

## 2023-10-13 PROCEDURE — 99442: CPT

## 2023-10-17 PROBLEM — R07.89 ATYPICAL CHEST PAIN: Status: ACTIVE | Noted: 2019-02-13

## 2023-10-17 PROBLEM — I10 HYPERTENSION, BENIGN: Status: ACTIVE | Noted: 2019-02-13

## 2023-11-21 ENCOUNTER — APPOINTMENT (OUTPATIENT)
Dept: VASCULAR SURGERY | Facility: CLINIC | Age: 53
End: 2023-11-21

## 2023-12-05 ENCOUNTER — APPOINTMENT (OUTPATIENT)
Dept: VASCULAR SURGERY | Facility: CLINIC | Age: 53
End: 2023-12-05
Payer: COMMERCIAL

## 2023-12-05 VITALS
DIASTOLIC BLOOD PRESSURE: 73 MMHG | SYSTOLIC BLOOD PRESSURE: 111 MMHG | OXYGEN SATURATION: 98 % | BODY MASS INDEX: 22.5 KG/M2 | HEIGHT: 66 IN | HEART RATE: 94 BPM | WEIGHT: 140 LBS | TEMPERATURE: 98.4 F

## 2023-12-05 DIAGNOSIS — I83.893 VARICOSE VEINS OF BILATERAL LOWER EXTREMITIES WITH OTHER COMPLICATIONS: ICD-10-CM

## 2023-12-05 DIAGNOSIS — I83.813 VARICOSE VEINS OF BILATERAL LOWER EXTREMITIES WITH PAIN: ICD-10-CM

## 2023-12-05 PROCEDURE — 93970 EXTREMITY STUDY: CPT

## 2023-12-05 PROCEDURE — 99203 OFFICE O/P NEW LOW 30 MIN: CPT | Mod: 25

## 2024-01-16 NOTE — ED PROVIDER NOTE - NS ED ROS FT
CONSTITUTIONAL:  No fever or chills, no bodyaches  HEENT:  No sore throat or headache, no nasal congestion  PULM:  No cough or shortness of breath  GI:  No diarrhea, no vomiting
RLE grossly 3/5, all other extremities grossly WFL/WNL/grossly assessed due to

## 2024-07-25 ENCOUNTER — APPOINTMENT (OUTPATIENT)
Dept: OTOLARYNGOLOGY | Facility: CLINIC | Age: 54
End: 2024-07-25
Payer: COMMERCIAL

## 2024-07-25 VITALS — HEIGHT: 66 IN | WEIGHT: 133 LBS | BODY MASS INDEX: 21.38 KG/M2

## 2024-07-25 DIAGNOSIS — Z86.79 PERSONAL HISTORY OF OTHER DISEASES OF THE CIRCULATORY SYSTEM: ICD-10-CM

## 2024-07-25 DIAGNOSIS — Z78.9 OTHER SPECIFIED HEALTH STATUS: ICD-10-CM

## 2024-07-25 DIAGNOSIS — Z83.3 FAMILY HISTORY OF DIABETES MELLITUS: ICD-10-CM

## 2024-07-25 DIAGNOSIS — Z87.891 PERSONAL HISTORY OF NICOTINE DEPENDENCE: ICD-10-CM

## 2024-07-25 DIAGNOSIS — E04.1 NONTOXIC SINGLE THYROID NODULE: ICD-10-CM

## 2024-07-25 PROCEDURE — 99203 OFFICE O/P NEW LOW 30 MIN: CPT

## 2024-07-25 RX ORDER — BUPROPION HYDROCHLORIDE 75 MG/1
TABLET, FILM COATED ORAL
Refills: 0 | Status: ACTIVE | COMMUNITY

## 2024-07-29 NOTE — HISTORY OF PRESENT ILLNESS
[de-identified] : Previously well-known to me.  History of thyroid nodularity.  Multiple benign cytologies in the past. Describes having essentially a cyst that has been aspirated many times.  At this point she is having some swelling as she described in her right thyroid area. No pain. No dysphagia or compressive features.  No change in voice.

## 2024-07-29 NOTE — ASSESSMENT
[FreeTextEntry1] : Thyroid nodule with cyst.  Explained to her the natural history of cysts reaccumulating with fluid. Will send for thyroid ultrasound at this time.

## 2024-12-30 ENCOUNTER — APPOINTMENT (OUTPATIENT)
Dept: OTOLARYNGOLOGY | Facility: CLINIC | Age: 54
End: 2024-12-30
Payer: COMMERCIAL

## 2024-12-30 DIAGNOSIS — H92.01 OTALGIA, RIGHT EAR: ICD-10-CM

## 2024-12-30 DIAGNOSIS — M26.629 ARTHRALGIA OF TEMPOROMANDIBULAR JOINT,: ICD-10-CM

## 2024-12-30 DIAGNOSIS — E04.1 NONTOXIC SINGLE THYROID NODULE: ICD-10-CM

## 2024-12-30 PROCEDURE — 99213 OFFICE O/P EST LOW 20 MIN: CPT

## 2024-12-31 NOTE — HISTORY OF PRESENT ILLNESS
[de-identified] : 12/30/2024: 53 y/o F well known to me with history of thyroid nodule, multiple benign cytologies in the past presents for repeat evaluation. Of note she did not have US thyroid as ordered by last visit.  Today she presents with right ear ache which started 4 days ago, which she would describe as a 7/10, stabbing pain. She took Ibuprofen and she notes it has improved. She denies hearing loss, tinnitus, or vertiginous symptoms. She used qtips and noticed blood tinged otorrhea this morning. No recent plane flights. No history of TMJ but she occasionally bruxes.

## 2024-12-31 NOTE — HISTORY OF PRESENT ILLNESS
[de-identified] : 12/30/2024: 53 y/o F well known to me with history of thyroid nodule, multiple benign cytologies in the past presents for repeat evaluation. Of note she did not have US thyroid as ordered by last visit.  Today she presents with right ear ache which started 4 days ago, which she would describe as a 7/10, stabbing pain. She took Ibuprofen and she notes it has improved. She denies hearing loss, tinnitus, or vertiginous symptoms. She used qtips and noticed blood tinged otorrhea this morning. No recent plane flights. No history of TMJ but she occasionally bruxes.

## 2024-12-31 NOTE — PHYSICAL EXAM
[TextEntry] : PHYSICAL EXAM  General: The patient was alert and oriented and in no distress. Voice was clear.  Eyes: The patient was alert, oriented and in no distress. Voice was clear.  Eyes: Ocular motility normal. No proptosis. Conjunctiva normal. Sclera white. There was no significant nystagmus or disconjugate gaze noted.  Face: The patient had no facial asymmetry or mass. The skin was unremarkable.  Ears: External ears were normal without deformity. Ear canals were clear Tympanic membranes were intact and normal. No perforation or effusion left tympanosclerosis +right palpable crepitus  Nose: The external nose had no significant deformity. There was no facial tenderness. On anterior rhinoscopy, the nasal mucosa was healthy in appearance. The anterior septum was midline. There were no visualized polyps purulence or masses. See procedure note for endonasal exam.  Oral cavity: Oral mucosa- normal. Oral and base of tongue- clear and without mass. Gingival and buccal mucosa- moist and without lesions. Palate- the palate moved well. There was no cleft palate. There appeared to be good salivary flow. Oral cavity/oropharynx- no pus, erythema or mass.  Neck: The neck was symmetrical. The parotid and submandibular glands were normal without masses. The trachea was midline and there was no unusual crepitus. Thyroid was smooth and nontender and no masses were palpated. Cervical adenopathy- none.

## 2024-12-31 NOTE — ASSESSMENT
[FreeTextEntry1] : - 12/30/2024: 53 y/o F well known to me with history of thyroid nodule, multiple benign cytologies in the past presents for repeat evaluation. Of note she did not have US thyroid as ordered by last visit. Physical exam was normal. I recommended US thyroid at her earliest convenience and to send us results to review.  Today she presents with right ear ache which started 4 days ago, which she would describe as a 7/10, stabbing pain. On physical exam she was found to have left tympanosclerosis, palpable crepitus of right TMJ. She was reassured she did not have an ear infection. I believe they are suffering from TMJ and patient was reassured no evidence of infection. I recommended follow up with dentistry/ oral surgeon to consider mouth guard and further recommendations. Follow up with me for any new/ worsening issues.   -US thyroid  -Follow up after for review -TMJ pamphlet -Follow up with dentistry to consider mouth guard -Follow up with me for any new/ worsening issues

## 2024-12-31 NOTE — ASSESSMENT
[FreeTextEntry1] : - 12/30/2024: 55 y/o F well known to me with history of thyroid nodule, multiple benign cytologies in the past presents for repeat evaluation. Of note she did not have US thyroid as ordered by last visit. Physical exam was normal. I recommended US thyroid at her earliest convenience and to send us results to review.  Today she presents with right ear ache which started 4 days ago, which she would describe as a 7/10, stabbing pain. On physical exam she was found to have left tympanosclerosis, palpable crepitus of right TMJ. She was reassured she did not have an ear infection. I believe they are suffering from TMJ and patient was reassured no evidence of infection. I recommended follow up with dentistry/ oral surgeon to consider mouth guard and further recommendations. Follow up with me for any new/ worsening issues.   -US thyroid  -Follow up after for review -TMJ pamphlet -Follow up with dentistry to consider mouth guard -Follow up with me for any new/ worsening issues

## 2025-01-02 ENCOUNTER — OUTPATIENT (OUTPATIENT)
Dept: OUTPATIENT SERVICES | Facility: HOSPITAL | Age: 55
LOS: 1 days | End: 2025-01-02

## 2025-01-02 ENCOUNTER — APPOINTMENT (OUTPATIENT)
Dept: ULTRASOUND IMAGING | Facility: CLINIC | Age: 55
End: 2025-01-02
Payer: COMMERCIAL

## 2025-01-02 PROCEDURE — 76536 US EXAM OF HEAD AND NECK: CPT | Mod: 26

## 2025-09-10 ENCOUNTER — APPOINTMENT (OUTPATIENT)
Dept: HEART AND VASCULAR | Facility: CLINIC | Age: 55
End: 2025-09-10
Payer: COMMERCIAL

## 2025-09-10 VITALS
HEIGHT: 66 IN | OXYGEN SATURATION: 97 % | HEART RATE: 100 BPM | WEIGHT: 141 LBS | DIASTOLIC BLOOD PRESSURE: 71 MMHG | SYSTOLIC BLOOD PRESSURE: 122 MMHG | BODY MASS INDEX: 22.66 KG/M2

## 2025-09-10 DIAGNOSIS — I10 ESSENTIAL (PRIMARY) HYPERTENSION: ICD-10-CM

## 2025-09-10 DIAGNOSIS — R07.89 OTHER CHEST PAIN: ICD-10-CM

## 2025-09-10 PROCEDURE — 93000 ELECTROCARDIOGRAM COMPLETE: CPT

## 2025-09-10 PROCEDURE — G2211 COMPLEX E/M VISIT ADD ON: CPT | Mod: NC

## 2025-09-10 PROCEDURE — 99214 OFFICE O/P EST MOD 30 MIN: CPT
